# Patient Record
Sex: MALE | Race: WHITE | NOT HISPANIC OR LATINO | Employment: FULL TIME | URBAN - METROPOLITAN AREA
[De-identification: names, ages, dates, MRNs, and addresses within clinical notes are randomized per-mention and may not be internally consistent; named-entity substitution may affect disease eponyms.]

---

## 2021-03-05 ENCOUNTER — APPOINTMENT (OUTPATIENT)
Dept: RADIOLOGY | Facility: MEDICAL CENTER | Age: 31
End: 2021-03-05
Attending: EMERGENCY MEDICINE
Payer: MEDICAID

## 2021-03-05 ENCOUNTER — OFFICE VISIT (OUTPATIENT)
Dept: URGENT CARE | Facility: CLINIC | Age: 31
End: 2021-03-05
Payer: MEDICAID

## 2021-03-05 ENCOUNTER — HOSPITAL ENCOUNTER (EMERGENCY)
Facility: MEDICAL CENTER | Age: 31
End: 2021-03-05
Attending: EMERGENCY MEDICINE
Payer: MEDICAID

## 2021-03-05 VITALS
RESPIRATION RATE: 14 BRPM | SYSTOLIC BLOOD PRESSURE: 127 MMHG | HEIGHT: 75 IN | TEMPERATURE: 96.9 F | DIASTOLIC BLOOD PRESSURE: 57 MMHG | BODY MASS INDEX: 32.21 KG/M2 | WEIGHT: 259.04 LBS | OXYGEN SATURATION: 94 % | HEART RATE: 93 BPM

## 2021-03-05 VITALS
RESPIRATION RATE: 18 BRPM | OXYGEN SATURATION: 99 % | HEART RATE: 109 BPM | WEIGHT: 259.4 LBS | TEMPERATURE: 98.3 F | HEIGHT: 75 IN | SYSTOLIC BLOOD PRESSURE: 130 MMHG | BODY MASS INDEX: 32.25 KG/M2 | DIASTOLIC BLOOD PRESSURE: 70 MMHG

## 2021-03-05 DIAGNOSIS — L03.116 CELLULITIS OF LEFT LOWER EXTREMITY: ICD-10-CM

## 2021-03-05 DIAGNOSIS — L03.116 LEFT LEG CELLULITIS: ICD-10-CM

## 2021-03-05 DIAGNOSIS — M79.605 PAIN AND SWELLING OF LEFT LOWER EXTREMITY: ICD-10-CM

## 2021-03-05 DIAGNOSIS — M79.89 PAIN AND SWELLING OF LEFT LOWER EXTREMITY: ICD-10-CM

## 2021-03-05 LAB
ALBUMIN SERPL BCP-MCNC: 3.6 G/DL (ref 3.2–4.9)
ALBUMIN/GLOB SERPL: 1.1 G/DL
ALP SERPL-CCNC: 80 U/L (ref 30–99)
ALT SERPL-CCNC: 10 U/L (ref 2–50)
ANION GAP SERPL CALC-SCNC: 12 MMOL/L (ref 7–16)
AST SERPL-CCNC: 13 U/L (ref 12–45)
BASOPHILS # BLD AUTO: 0.4 % (ref 0–1.8)
BASOPHILS # BLD: 0.04 K/UL (ref 0–0.12)
BILIRUB SERPL-MCNC: 0.5 MG/DL (ref 0.1–1.5)
BUN SERPL-MCNC: 13 MG/DL (ref 8–22)
CALCIUM SERPL-MCNC: 9.2 MG/DL (ref 8.5–10.5)
CHLORIDE SERPL-SCNC: 97 MMOL/L (ref 96–112)
CO2 SERPL-SCNC: 22 MMOL/L (ref 20–33)
CREAT SERPL-MCNC: 0.58 MG/DL (ref 0.5–1.4)
EOSINOPHIL # BLD AUTO: 0.04 K/UL (ref 0–0.51)
EOSINOPHIL NFR BLD: 0.4 % (ref 0–6.9)
ERYTHROCYTE [DISTWIDTH] IN BLOOD BY AUTOMATED COUNT: 46.5 FL (ref 35.9–50)
GLOBULIN SER CALC-MCNC: 3.4 G/DL (ref 1.9–3.5)
GLUCOSE SERPL-MCNC: 109 MG/DL (ref 65–99)
HCT VFR BLD AUTO: 46.1 % (ref 42–52)
HGB BLD-MCNC: 14.9 G/DL (ref 14–18)
IMM GRANULOCYTES # BLD AUTO: 0.03 K/UL (ref 0–0.11)
IMM GRANULOCYTES NFR BLD AUTO: 0.3 % (ref 0–0.9)
LACTATE BLD-SCNC: 1 MMOL/L (ref 0.5–2)
LYMPHOCYTES # BLD AUTO: 1.5 K/UL (ref 1–4.8)
LYMPHOCYTES NFR BLD: 15.9 % (ref 22–41)
MCH RBC QN AUTO: 29.3 PG (ref 27–33)
MCHC RBC AUTO-ENTMCNC: 32.3 G/DL (ref 33.7–35.3)
MCV RBC AUTO: 90.6 FL (ref 81.4–97.8)
MONOCYTES # BLD AUTO: 0.76 K/UL (ref 0–0.85)
MONOCYTES NFR BLD AUTO: 8.1 % (ref 0–13.4)
NEUTROPHILS # BLD AUTO: 7.07 K/UL (ref 1.82–7.42)
NEUTROPHILS NFR BLD: 74.9 % (ref 44–72)
NRBC # BLD AUTO: 0 K/UL
NRBC BLD-RTO: 0 /100 WBC
PLATELET # BLD AUTO: 321 K/UL (ref 164–446)
PMV BLD AUTO: 9.7 FL (ref 9–12.9)
POTASSIUM SERPL-SCNC: 4.4 MMOL/L (ref 3.6–5.5)
PROT SERPL-MCNC: 7 G/DL (ref 6–8.2)
RBC # BLD AUTO: 5.09 M/UL (ref 4.7–6.1)
SODIUM SERPL-SCNC: 131 MMOL/L (ref 135–145)
WBC # BLD AUTO: 9.4 K/UL (ref 4.8–10.8)

## 2021-03-05 PROCEDURE — 83605 ASSAY OF LACTIC ACID: CPT

## 2021-03-05 PROCEDURE — 700102 HCHG RX REV CODE 250 W/ 637 OVERRIDE(OP): Performed by: EMERGENCY MEDICINE

## 2021-03-05 PROCEDURE — 87040 BLOOD CULTURE FOR BACTERIA: CPT

## 2021-03-05 PROCEDURE — A9270 NON-COVERED ITEM OR SERVICE: HCPCS | Performed by: EMERGENCY MEDICINE

## 2021-03-05 PROCEDURE — 80053 COMPREHEN METABOLIC PANEL: CPT

## 2021-03-05 PROCEDURE — 93971 EXTREMITY STUDY: CPT | Mod: LT

## 2021-03-05 PROCEDURE — 85025 COMPLETE CBC W/AUTO DIFF WBC: CPT

## 2021-03-05 PROCEDURE — 99203 OFFICE O/P NEW LOW 30 MIN: CPT | Performed by: PHYSICIAN ASSISTANT

## 2021-03-05 PROCEDURE — 99283 EMERGENCY DEPT VISIT LOW MDM: CPT

## 2021-03-05 RX ORDER — DOXYCYCLINE 100 MG/1
100 CAPSULE ORAL 2 TIMES DAILY
Qty: 14 CAPSULE | Refills: 0 | Status: SHIPPED | OUTPATIENT
Start: 2021-03-05 | End: 2021-03-12

## 2021-03-05 RX ORDER — DOXYCYCLINE 100 MG/1
100 TABLET ORAL ONCE
Status: COMPLETED | OUTPATIENT
Start: 2021-03-05 | End: 2021-03-05

## 2021-03-05 RX ADMIN — DOXYCYCLINE 100 MG: 100 TABLET, FILM COATED ORAL at 23:36

## 2021-03-05 ASSESSMENT — ENCOUNTER SYMPTOMS
EYE REDNESS: 0
EYE DISCHARGE: 0
FEVER: 0
SORE THROAT: 0
LEG PAIN: 1
VOMITING: 0
COUGH: 0
NAUSEA: 0
SHORTNESS OF BREATH: 0
HEADACHES: 0

## 2021-03-06 PROCEDURE — 93971 EXTREMITY STUDY: CPT | Mod: 26,LT | Performed by: INTERNAL MEDICINE

## 2021-03-06 NOTE — ED PROVIDER NOTES
"ED Provider Note    CHIEF COMPLAINT  Chief Complaint   Patient presents with   • Leg Swelling     pt sent from  for potential DVT workup. pt presents with LLE swelling and redness. pt states this started two days ago   • Deep Vein Thrombosis       HPI  Bar Dave is a 30 y.o. male who presents to the emergency room of her left leg pain and swelling. Past medical history is benign. No significant family history. Half pack per day smoker. Recently unemployed although about to start a new job at the employment facility. Yesterday he noticed some discomfort to his leg and he pulled up his pants noticed some swelling to his left calf and foot additionally with some redness and discomfort with palpation. Today became significantly worse and went to urgent care which was ultimately able to see him this evening and upon their physical exam the emergently tender to the ER for evaluation possible cellulitis versus DVT workup. No recent trauma. No significant physical exertion. No known trauma. No fever chills. No other complaints.    REVIEW OF SYSTEMS  See HPI for further details. All other systems are negative.     PAST MEDICAL HISTORY       SOCIAL HISTORY  Social History     Tobacco Use   • Smoking status: Current Every Day Smoker     Packs/day: 0.50   • Smokeless tobacco: Never Used   Substance and Sexual Activity   • Alcohol use: Not on file   • Drug use: Not on file   • Sexual activity: Not on file       SURGICAL HISTORY  patient denies any surgical history    CURRENT MEDICATIONS  Home Medications     Reviewed by Marianna Rios R.N. (Registered Nurse) on 03/05/21 at 1939  Med List Status: <None>   Medication Last Dose Status        Patient Feliciano Taking any Medications                       ALLERGIES  No Known Allergies    PHYSICAL EXAM  VITAL SIGNS: /57   Pulse 93   Temp 36.1 °C (96.9 °F) (Temporal)   Resp 14   Ht 1.905 m (6' 3\")   Wt 118 kg (259 lb 0.7 oz)   SpO2 94%   BMI 32.38 kg/m²  " @[facility][[id number]::@   Pulse ox interpretation: I interpret this pulse ox as normal.  Constitutional: Alert in no apparent distress.  HENT:  Bilateral external ears normal, Nose normal.   Eyes: Pupils are equal and reactive  Cardiovascular: Regular rate and rhythm, no murmurs.   Thorax & Lungs: Normal breath sounds, No respiratory distress  Abdomen: Bowel sounds normal, Soft, No tenderness  Skin: Warm, Dry, No erythema, No rash.    Extremities: Intact distal pulses. Left lower extremity: nontender hip, thigh and knee. Significant deep red erythema to the lateral distal leg which to a lighter shade of erythema is a circumferential and mildly tender to palpation with increased warmth. Additional brawny edema to foot with slight discoloration there which looks more chronic. One plus DPP  Musculoskeletal: Good range of motion in all major joints.   Neurologic: Alert , Normal motor function, Normal sensory function, No focal deficits noted.   Psychiatric: Affect normal, Judgment normal, Mood normal.       DIAGNOSTIC STUDIES / PROCEDURES    LABS  Results for orders placed or performed during the hospital encounter of 03/05/21   CBC WITH DIFFERENTIAL   Result Value Ref Range    WBC 9.4 4.8 - 10.8 K/uL    RBC 5.09 4.70 - 6.10 M/uL    Hemoglobin 14.9 14.0 - 18.0 g/dL    Hematocrit 46.1 42.0 - 52.0 %    MCV 90.6 81.4 - 97.8 fL    MCH 29.3 27.0 - 33.0 pg    MCHC 32.3 (L) 33.7 - 35.3 g/dL    RDW 46.5 35.9 - 50.0 fL    Platelet Count 321 164 - 446 K/uL    MPV 9.7 9.0 - 12.9 fL    Neutrophils-Polys 74.90 (H) 44.00 - 72.00 %    Lymphocytes 15.90 (L) 22.00 - 41.00 %    Monocytes 8.10 0.00 - 13.40 %    Eosinophils 0.40 0.00 - 6.90 %    Basophils 0.40 0.00 - 1.80 %    Immature Granulocytes 0.30 0.00 - 0.90 %    Nucleated RBC 0.00 /100 WBC    Neutrophils (Absolute) 7.07 1.82 - 7.42 K/uL    Lymphs (Absolute) 1.50 1.00 - 4.80 K/uL    Monos (Absolute) 0.76 0.00 - 0.85 K/uL    Eos (Absolute) 0.04 0.00 - 0.51 K/uL    Baso (Absolute) 0.04 0.00 -  0.12 K/uL    Immature Granulocytes (abs) 0.03 0.00 - 0.11 K/uL    NRBC (Absolute) 0.00 K/uL   COMP METABOLIC PANEL   Result Value Ref Range    Sodium 131 (L) 135 - 145 mmol/L    Potassium 4.4 3.6 - 5.5 mmol/L    Chloride 97 96 - 112 mmol/L    Co2 22 20 - 33 mmol/L    Anion Gap 12.0 7.0 - 16.0    Glucose 109 (H) 65 - 99 mg/dL    Bun 13 8 - 22 mg/dL    Creatinine 0.58 0.50 - 1.40 mg/dL    Calcium 9.2 8.5 - 10.5 mg/dL    AST(SGOT) 13 12 - 45 U/L    ALT(SGPT) 10 2 - 50 U/L    Alkaline Phosphatase 80 30 - 99 U/L    Total Bilirubin 0.5 0.1 - 1.5 mg/dL    Albumin 3.6 3.2 - 4.9 g/dL    Total Protein 7.0 6.0 - 8.2 g/dL    Globulin 3.4 1.9 - 3.5 g/dL    A-G Ratio 1.1 g/dL   LACTIC ACID   Result Value Ref Range    Lactic Acid 1.0 0.5 - 2.0 mmol/L   ESTIMATED GFR   Result Value Ref Range    GFR If African American >60 >60 mL/min/1.73 m 2    GFR If Non African American >60 >60 mL/min/1.73 m 2         RADIOLOGY  US-EXTREMITY VENOUS LOWER UNILAT LEFT                 COURSE & MEDICAL DECISION MAKING  Pertinent Labs & Imaging studies reviewed. (See chart for details)  30-year-old male presented the emergency department with leg swelling and redness. History as above. DVT ultrasound is negative. Laboratory valuation also largely unremarkable. No elevation of lactic acid. No significant elevations WBCs although slight left shift. At this point the patient will be started on doxycycline for coverage for cellulitis. He will follow-up with his primary care physician next week for follow-up is understanding of strict return precautions any changes or worsening or no improvement.      The patient will return for worsening symptoms and is stable at the time of discharge. The patient verbalizes understanding and will comply.    FINAL IMPRESSION  1. Left leg cellulitis            Electronically signed by: Reynaldo Dudley M.D., 3/5/2021 10:18 PM

## 2021-03-06 NOTE — ED TRIAGE NOTES
"Chief Complaint   Patient presents with   • Leg Swelling     pt sent from  for potential DVT workup. pt presents with LLE swelling and redness. pt states this started two days ago   • Deep Vein Thrombosis         Pt walk in today for above complaint. Pt ambulatory into triage room. Pt states yesterday when he was walking around he was limping a little bit, pt denies any tenderness behind his knee. Pts LLE is warm, swelling extends to the foot. LLE is very red as well. Pt denies any SOB with ambulation. Educated pt on triage process and to notify if there is any change.          /97   Pulse (!) 114   Temp 36.1 °C (96.9 °F) (Temporal)   Resp 14   Ht 1.905 m (6' 3\")   Wt 118 kg (259 lb 0.7 oz)   SpO2 96%   BMI 32.38 kg/m²     "

## 2021-03-06 NOTE — ED NOTES
Pt given discharge instructions, prescription sent to pharm, first dose given here. PT ambulatory to [place].

## 2021-03-06 NOTE — PROGRESS NOTES
"Subjective:      Bar Dave is a 30 y.o. male who presents with Leg Pain (Swollen (L) leg, Very red, tender, x1 day )          This is a new problem.   The patient presents to clinic complaining of possible cellulitis to the left lower leg x1 day.  The patient states he noticed his left lower leg felt \"tight\".  The patient states he then noticed associated swelling and redness to the left lower leg.  The patient reports associated tenderness to the left lower leg.  The patient reports no recent injury or trauma to the left lower leg.  He also reports no associated fever.  The patient is taken OTC ibuprofen for his current symptoms.  The patient denies recent prolonged sitting/travel.  He does not use hormones.  The patient is a current smoker.  The patient reports no prior history of DVT/PE.    Leg Pain  This is a new problem. Episode onset: x 1 day ago. The problem occurs constantly. The problem has been gradually worsening. Pertinent negatives include no chest pain, congestion, coughing, fever, headaches, nausea, rash, sore throat or vomiting. He has tried NSAIDs for the symptoms.     PMH:  has no past medical history on file.  MEDS: No current outpatient medications on file.  ALLERGIES: No Known Allergies  SURGHX: No past surgical history on file.  SOCHX:  reports that he has been smoking. He has never used smokeless tobacco.  FH: Family history was reviewed, no pertinent findings to report    Review of Systems   Constitutional: Negative for fever.   HENT: Negative for congestion, ear pain and sore throat.    Eyes: Negative for discharge and redness.   Respiratory: Negative for cough and shortness of breath.    Cardiovascular: Positive for leg swelling. Negative for chest pain.   Gastrointestinal: Negative for nausea and vomiting.   Musculoskeletal: Negative for joint pain.   Skin: Negative for rash.   Neurological: Negative for headaches.          Objective:     /70 (BP Location: Left arm, Patient " "Position: Sitting, BP Cuff Size: Adult long)   Pulse (!) 109   Temp 36.8 °C (98.3 °F) (Temporal)   Resp 18   Ht 1.905 m (6' 3\")   Wt 118 kg (259 lb 6.4 oz)   SpO2 99%   BMI 32.42 kg/m²      Physical Exam  Constitutional:       General: He is not in acute distress.     Appearance: Normal appearance. He is well-developed. He is not ill-appearing.   HENT:      Head: Normocephalic and atraumatic.      Right Ear: External ear normal.      Left Ear: External ear normal.   Eyes:      Extraocular Movements: Extraocular movements intact.      Conjunctiva/sclera: Conjunctivae normal.   Cardiovascular:      Rate and Rhythm: Tachycardia present.   Pulmonary:      Effort: Pulmonary effort is normal.   Musculoskeletal:      Cervical back: Normal range of motion and neck supple.      Comments:   Left Lower Leg:  Diffuse swelling of the left lower leg involving the left ankle and foot with associated tenderness to palpation, erythema, and increased warmth.  No open wounds/lesions.  No discharge/weeping.   Skin:     General: Skin is warm and dry.   Neurological:      Mental Status: He is alert and oriented to person, place, and time.                 Assessment/Plan:       1. Pain and swelling of left lower extremity    2. Cellulitis of left lower extremity    The patient's presenting symptoms and physical exam findings are consistent with pain and swelling of the left lower extremity likely secondary to acute cellulitis.  On physical exam, the patient had diffuse swelling of the left lower leg involving the left ankle and foot with associated tenderness palpation, erythema, and increased warmth.  No open wounds/lesions were appreciated.  No discharge/weeping was noted.  Based on the patient's presenting symptoms and physical exam findings, I believe the patient would benefit from a higher level of care.  The patient symptoms are consistent with significant cellulitis of the left lower extremity.  However, an acute DVT cannot " be ruled out at this time.  Therefore, I recommend the patient transferred to the [facility] for further evaluation and management.  The patient agrees to this plan.  The patient stable for transfer via private vehicle at this time.  Advised the patient of the social risk of not seeking further care for his current symptoms, and he verbalized understanding.    Plan:  Transfer patient to [facility] for further evaluation and management.    I personally reviewed prior external notes and test results pertinent to today's visit.  I have independently reviewed and interpreted all diagnostics ordered during this urgent care visit.     Time spent evaluating this patient was at least 30 minutes and includes preparing for visit, obtaining history, exam and evaluation, ordering labs/tests/procedures/medications, independent interpretation, and counseling/education.    Please note that this dictation was created using voice recognition software. I have made every reasonable attempt to correct obvious errors, but I expect that there may be errors of grammar and possibly content that I did not discover before finalizing the note.     This note was electronically signed by Abbie Raphael PA-C

## 2021-03-11 LAB
BACTERIA BLD CULT: NORMAL
BACTERIA BLD CULT: NORMAL
SIGNIFICANT IND 70042: NORMAL
SIGNIFICANT IND 70042: NORMAL
SITE SITE: NORMAL
SITE SITE: NORMAL
SOURCE SOURCE: NORMAL
SOURCE SOURCE: NORMAL

## 2021-06-18 ENCOUNTER — TELEPHONE (OUTPATIENT)
Dept: SCHEDULING | Facility: IMAGING CENTER | Age: 31
End: 2021-06-18

## 2021-06-18 ENCOUNTER — OFFICE VISIT (OUTPATIENT)
Dept: MEDICAL GROUP | Facility: PHYSICIAN GROUP | Age: 31
End: 2021-06-18
Payer: COMMERCIAL

## 2021-06-18 VITALS
RESPIRATION RATE: 18 BRPM | BODY MASS INDEX: 31.16 KG/M2 | HEIGHT: 75 IN | SYSTOLIC BLOOD PRESSURE: 118 MMHG | WEIGHT: 250.6 LBS | TEMPERATURE: 98.8 F | OXYGEN SATURATION: 95 % | HEART RATE: 110 BPM | DIASTOLIC BLOOD PRESSURE: 80 MMHG

## 2021-06-18 DIAGNOSIS — F17.219 CIGARETTE NICOTINE DEPENDENCE WITH NICOTINE-INDUCED DISORDER: ICD-10-CM

## 2021-06-18 DIAGNOSIS — E66.3 OVERWEIGHT: ICD-10-CM

## 2021-06-18 DIAGNOSIS — M25.511 PAIN IN JOINT OF RIGHT SHOULDER: ICD-10-CM

## 2021-06-18 DIAGNOSIS — Z00.00 WELLNESS EXAMINATION: ICD-10-CM

## 2021-06-18 DIAGNOSIS — R22.42 LOCALIZED SWELLING OF LEFT LOWER LEG: ICD-10-CM

## 2021-06-18 PROCEDURE — 99203 OFFICE O/P NEW LOW 30 MIN: CPT | Mod: 25 | Performed by: FAMILY MEDICINE

## 2021-06-18 PROCEDURE — 90471 IMMUNIZATION ADMIN: CPT | Performed by: FAMILY MEDICINE

## 2021-06-18 PROCEDURE — 90715 TDAP VACCINE 7 YRS/> IM: CPT | Performed by: FAMILY MEDICINE

## 2021-06-18 ASSESSMENT — FIBROSIS 4 INDEX: FIB4 SCORE: 0.41

## 2021-06-18 ASSESSMENT — PATIENT HEALTH QUESTIONNAIRE - PHQ9: CLINICAL INTERPRETATION OF PHQ2 SCORE: 0

## 2021-06-18 NOTE — PROGRESS NOTES
Subjective:     CC:   Chief Complaint   Patient presents with   • Establish Care       HPI:   Bar presents today to establish care and also needs a work physical form filled out.  Patient has not had a lipid panel done we will need to get that done.  Patient was seen a couple months ago for swelling to his left lower leg at that time she had a ultrasound done which was negative for blood clot.  And he was treated for cellulitis.  Patient expressed concern that this is happened before patient is not using compression stockings sounds like he may have gotten medical grade compression stockings that are hard to put on.  Patient also has had a procedure done to his right shoulder that states that it occasionally still bothers him.  Patient admits that he has gained weight recently due to Covid.  Patient also has continued to smoke but states that his work has a smoking cessation program that he is interested in participating in    History reviewed. No pertinent past medical history.    Social History     Tobacco Use   • Smoking status: Current Every Day Smoker     Packs/day: 0.50   • Smokeless tobacco: Never Used   Vaping Use   • Vaping Use: Some days   • Substances: Nicotine   • Devices: [employer] tank   Substance Use Topics   • Alcohol use: Yes     Comment: Rare   • Drug use: Never       No current [facility]-ordered outpatient medications on file.     No current [facility]-ordered facility-administered medications on file.       Allergies:  Patient has no known allergies.    Health Maintenance: Completed    ROS:  Gen: no fevers/chills  Eyes: no changes in vision  ENT: no sore throat, no hearing loss, no bloody nose  Pulm: no sob, no cough  CV: no chest pain, no palpitations  GI: no nausea/vomiting, no diarrhea  : no dysuria  MSk: no myalgias  Skin: no rash  Neuro: no headaches, no numbness/tingling  Heme/Lymph: no easy bruising    Objective:     Exam:  /80   Pulse (!) 110   Temp 37.1 °C (98.8 °F)   Resp 18   Ht  "1.905 m (6' 3\")   Wt 114 kg (250 lb 9.6 oz)   SpO2 95%   BMI 31.32 kg/m²  Body mass index is 31.32 kg/m².    Gen: Alert and oriented, No apparent distress.  Skin: Warm and dry.  No obvious lesions.  Eyes: Sclera wnl Pupils normal in size  ENT: Canals wnl and TM are not red, on oral exam no lesions noted   Lungs: Normal effort, CTA bilaterally, no wheezes, rhonchi, or rales  CV: Regular rate and rhythm. No murmurs, rubs, or gallops.  ABD: Soft non-tender no organomegaly  Musculoskeletal: Normal gait. No extremity cyanosis, clubbing, or edema.  Neuro: Oriented to person, place and time  Psych: Mood is wnl       Labs: Fasting labs were ordered I gave him a listing of all the [facility] labs he can go to    Assessment & Plan:     30 y.o. male with the following -     1. Wellness examination  Patient is agreeable to get his tetanus shot done he would like to wait on his pneumonia shot  - Tdap =>6yo IM  - CBC WITH DIFFERENTIAL; Future  - Lipid Profile; Future    2. Pain in joint of right shoulder  We will see patient back for follow-up for further discussion on whether he wants a referral to Ortho this is a chronic problem    3. Cigarette nicotine dependence with nicotine-induced disorder  .  Patient is interested in stop smoking he will further pursue this with his work this is a chronic problem    4. Overweight  Get his thyroid test done this is a chronic problem  - FREE THYROXINE; Future  - TSH; Future    5. Localized swelling of left lower leg  I would recommend patient start wearing them at night some mild compression stockings he can buy at the running store to see if this seems to help if this continues may need to refer him to either vascular or dermatology this is a chronic problem       Return in about 4 weeks (around 7/16/2021).    Please note that this dictation was created using voice recognition software. I have made every reasonable attempt to correct obvious errors, but I expect that there are errors of " grammar and possibly content that I did not discover before finalizing the note.

## 2021-06-25 ENCOUNTER — APPOINTMENT (OUTPATIENT)
Dept: MEDICAL GROUP | Facility: PHYSICIAN GROUP | Age: 31
End: 2021-06-25
Payer: COMMERCIAL

## 2021-07-13 ENCOUNTER — HOSPITAL ENCOUNTER (OUTPATIENT)
Dept: LAB | Facility: MEDICAL CENTER | Age: 31
End: 2021-07-13
Attending: FAMILY MEDICINE
Payer: COMMERCIAL

## 2021-07-13 DIAGNOSIS — E66.3 OVERWEIGHT: ICD-10-CM

## 2021-07-13 DIAGNOSIS — Z00.00 WELLNESS EXAMINATION: ICD-10-CM

## 2021-07-13 LAB
BASOPHILS # BLD AUTO: 0.7 % (ref 0–1.8)
BASOPHILS # BLD: 0.04 K/UL (ref 0–0.12)
CHOLEST SERPL-MCNC: 170 MG/DL (ref 100–199)
EOSINOPHIL # BLD AUTO: 0.06 K/UL (ref 0–0.51)
EOSINOPHIL NFR BLD: 1 % (ref 0–6.9)
ERYTHROCYTE [DISTWIDTH] IN BLOOD BY AUTOMATED COUNT: 48.7 FL (ref 35.9–50)
FASTING STATUS PATIENT QL REPORTED: NORMAL
HCT VFR BLD AUTO: 48.5 % (ref 42–52)
HDLC SERPL-MCNC: 39 MG/DL
HGB BLD-MCNC: 15.6 G/DL (ref 14–18)
IMM GRANULOCYTES # BLD AUTO: 0.01 K/UL (ref 0–0.11)
IMM GRANULOCYTES NFR BLD AUTO: 0.2 % (ref 0–0.9)
LDLC SERPL CALC-MCNC: 122 MG/DL
LYMPHOCYTES # BLD AUTO: 1.53 K/UL (ref 1–4.8)
LYMPHOCYTES NFR BLD: 26 % (ref 22–41)
MCH RBC QN AUTO: 29.8 PG (ref 27–33)
MCHC RBC AUTO-ENTMCNC: 32.2 G/DL (ref 33.7–35.3)
MCV RBC AUTO: 92.7 FL (ref 81.4–97.8)
MONOCYTES # BLD AUTO: 0.37 K/UL (ref 0–0.85)
MONOCYTES NFR BLD AUTO: 6.3 % (ref 0–13.4)
NEUTROPHILS # BLD AUTO: 3.88 K/UL (ref 1.82–7.42)
NEUTROPHILS NFR BLD: 65.8 % (ref 44–72)
NRBC # BLD AUTO: 0 K/UL
NRBC BLD-RTO: 0 /100 WBC
PLATELET # BLD AUTO: 311 K/UL (ref 164–446)
PMV BLD AUTO: 10.6 FL (ref 9–12.9)
RBC # BLD AUTO: 5.23 M/UL (ref 4.7–6.1)
T4 FREE SERPL-MCNC: 1.07 NG/DL (ref 0.93–1.7)
TRIGL SERPL-MCNC: 46 MG/DL (ref 0–149)
TSH SERPL DL<=0.005 MIU/L-ACNC: 0.19 UIU/ML (ref 0.38–5.33)
WBC # BLD AUTO: 5.9 K/UL (ref 4.8–10.8)

## 2021-07-13 PROCEDURE — 85025 COMPLETE CBC W/AUTO DIFF WBC: CPT

## 2021-07-13 PROCEDURE — 36415 COLL VENOUS BLD VENIPUNCTURE: CPT

## 2021-07-13 PROCEDURE — 84439 ASSAY OF FREE THYROXINE: CPT

## 2021-07-13 PROCEDURE — 84443 ASSAY THYROID STIM HORMONE: CPT

## 2021-07-13 PROCEDURE — 80061 LIPID PANEL: CPT

## 2021-07-22 ENCOUNTER — OFFICE VISIT (OUTPATIENT)
Dept: MEDICAL GROUP | Facility: PHYSICIAN GROUP | Age: 31
End: 2021-07-22
Payer: COMMERCIAL

## 2021-07-22 VITALS
BODY MASS INDEX: 31.13 KG/M2 | HEIGHT: 75 IN | TEMPERATURE: 98.5 F | OXYGEN SATURATION: 95 % | WEIGHT: 250.4 LBS | HEART RATE: 98 BPM | DIASTOLIC BLOOD PRESSURE: 82 MMHG | RESPIRATION RATE: 16 BRPM | SYSTOLIC BLOOD PRESSURE: 122 MMHG

## 2021-07-22 DIAGNOSIS — R79.89 DECREASED THYROID STIMULATING HORMONE (TSH) LEVEL: ICD-10-CM

## 2021-07-22 DIAGNOSIS — R73.9 HYPERGLYCEMIA: ICD-10-CM

## 2021-07-22 DIAGNOSIS — E78.6 LOW HDL (UNDER 40): ICD-10-CM

## 2021-07-22 PROCEDURE — 99214 OFFICE O/P EST MOD 30 MIN: CPT | Performed by: FAMILY MEDICINE

## 2021-07-22 ASSESSMENT — FIBROSIS 4 INDEX: FIB4 SCORE: 0.42

## 2021-07-22 NOTE — PROGRESS NOTES
"Subjective:     CC:   Chief Complaint   Patient presents with   • Follow-Up       HPI:   Bar presents today for follow-up of his labs.  Patient states he is doing well and trying to exercise and lose weight.    History reviewed. No pertinent past medical history.    Social History     Tobacco Use   • Smoking status: Current Every Day Smoker     Packs/day: 0.50   • Smokeless tobacco: Never Used   Vaping Use   • Vaping Use: Some days   • Substances: Nicotine   • Devices: [employer] tank   Substance Use Topics   • Alcohol use: Yes     Comment: Rare   • Drug use: Never       No current [facility]-ordered outpatient medications on file.     No current [facility]-ordered facility-administered medications on file.       Allergies:  Patient has no known allergies.    Health Maintenance: Completed    ROS:  Gen: no fevers/chills  Eyes: no changes in vision  ENT: no sore throat, no hearing loss, no bloody nose  Pulm: no sob, no cough  CV: no chest pain, no palpitations  GI: no nausea/vomiting, no diarrhea  : no dysuria  Neuro: no headaches, no numbness/tingling  Heme/Lymph: no easy bruising    Objective:     Exam:  /82   Pulse 98   Temp 36.9 °C (98.5 °F)   Resp 16   Ht 1.905 m (6' 3\")   Wt 114 kg (250 lb 6.4 oz)   SpO2 95%   BMI 31.30 kg/m²  Body mass index is 31.3 kg/m².    Gen: Alert and oriented, No apparent distress.  Skin: Warm and dry.  No obvious lesions.  Eyes: Sclera wnl Pupils normal in size  Neck: Patient has no thyroid enlargement or tenderness on palpation  Lungs: Normal effort, CTA bilaterally, no wheezes, rhonchi, or rales  CV: Regular rate and rhythm. No murmurs, rubs, or gallops.  Musculoskeletal: Normal gait. No extremity cyanosis, clubbing, or edema.  Neuro: Oriented to person, place and time  Psych: Mood is wnl       Labs: Recommend get nonfasting blood sugar this was ordered    Assessment & Plan:     30 y.o. male with the following -     1. Decreased thyroid stimulating hormone (TSH) " level  Recommend doing a T3 and also thyroid antibody test.  This is a acute problem  - Comp Metabolic Panel; Future  - TRIIDOTHYRONINE; Future  - THYROID ANTIBODIES    2. Low HDL (under 40)  I went over his lipid panel his HDL is below 41 way to increase this is regular exercise we will repeat this again in 3 months this is a chronic problem    3. Hyperglycemia  We will go ahead and recheck this since the last glucose was nonfasting       Return in about 1 week (around 7/29/2021).    Please note that this dictation was created using voice recognition software. I have made every reasonable attempt to correct obvious errors, but I expect that there are errors of grammar and possibly content that I did not discover before finalizing the note.

## 2021-07-31 ENCOUNTER — HOSPITAL ENCOUNTER (OUTPATIENT)
Dept: LAB | Facility: MEDICAL CENTER | Age: 31
End: 2021-07-31
Attending: FAMILY MEDICINE
Payer: COMMERCIAL

## 2021-07-31 DIAGNOSIS — R79.89 DECREASED THYROID STIMULATING HORMONE (TSH) LEVEL: ICD-10-CM

## 2021-07-31 LAB
ALBUMIN SERPL BCP-MCNC: 4.4 G/DL (ref 3.2–4.9)
ALBUMIN/GLOB SERPL: 1.5 G/DL
ALP SERPL-CCNC: 77 U/L (ref 30–99)
ALT SERPL-CCNC: 25 U/L (ref 2–50)
ANION GAP SERPL CALC-SCNC: 10 MMOL/L (ref 7–16)
AST SERPL-CCNC: 17 U/L (ref 12–45)
BILIRUB SERPL-MCNC: 0.5 MG/DL (ref 0.1–1.5)
BUN SERPL-MCNC: 16 MG/DL (ref 8–22)
CALCIUM SERPL-MCNC: 9.1 MG/DL (ref 8.5–10.5)
CHLORIDE SERPL-SCNC: 102 MMOL/L (ref 96–112)
CO2 SERPL-SCNC: 26 MMOL/L (ref 20–33)
CREAT SERPL-MCNC: 0.83 MG/DL (ref 0.5–1.4)
GLOBULIN SER CALC-MCNC: 3 G/DL (ref 1.9–3.5)
GLUCOSE SERPL-MCNC: 107 MG/DL (ref 65–99)
POTASSIUM SERPL-SCNC: 4 MMOL/L (ref 3.6–5.5)
PROT SERPL-MCNC: 7.4 G/DL (ref 6–8.2)
SODIUM SERPL-SCNC: 138 MMOL/L (ref 135–145)
T3 SERPL-MCNC: 119 NG/DL (ref 60–181)

## 2021-07-31 PROCEDURE — 86376 MICROSOMAL ANTIBODY EACH: CPT

## 2021-07-31 PROCEDURE — 84480 ASSAY TRIIODOTHYRONINE (T3): CPT

## 2021-07-31 PROCEDURE — 36415 COLL VENOUS BLD VENIPUNCTURE: CPT

## 2021-07-31 PROCEDURE — 80053 COMPREHEN METABOLIC PANEL: CPT

## 2021-08-02 LAB — THYROPEROXIDASE AB SERPL-ACNC: 2.6 IU/ML (ref 0–9)

## 2021-08-04 ENCOUNTER — OFFICE VISIT (OUTPATIENT)
Dept: MEDICAL GROUP | Facility: PHYSICIAN GROUP | Age: 31
End: 2021-08-04
Payer: COMMERCIAL

## 2021-08-04 VITALS
HEIGHT: 75 IN | OXYGEN SATURATION: 94 % | SYSTOLIC BLOOD PRESSURE: 122 MMHG | WEIGHT: 253.38 LBS | DIASTOLIC BLOOD PRESSURE: 78 MMHG | BODY MASS INDEX: 31.5 KG/M2 | RESPIRATION RATE: 20 BRPM | TEMPERATURE: 98.1 F | HEART RATE: 100 BPM

## 2021-08-04 DIAGNOSIS — E66.3 OVERWEIGHT: ICD-10-CM

## 2021-08-04 DIAGNOSIS — R73.9 HYPERGLYCEMIA: ICD-10-CM

## 2021-08-04 DIAGNOSIS — R79.89 LOW TSH LEVEL: ICD-10-CM

## 2021-08-04 DIAGNOSIS — E78.6 LOW HDL (UNDER 40): ICD-10-CM

## 2021-08-04 PROCEDURE — 99213 OFFICE O/P EST LOW 20 MIN: CPT | Performed by: FAMILY MEDICINE

## 2021-08-04 ASSESSMENT — FIBROSIS 4 INDEX: FIB4 SCORE: 0.35

## 2021-08-04 NOTE — PROGRESS NOTES
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  Patient is working on increasing his exercise and also avoiding breads and pasta.  Patient has no complaints today    History reviewed. No pertinent past medical history.    Social History     Tobacco Use   • Smoking status: Current Every Day Smoker     Packs/day: 0.50   • Smokeless tobacco: Never Used   Vaping Use   • Vaping Use: Some days   • Substances: Nicotine   • Devices: [employer] tank   Substance Use Topics   • Alcohol use: Yes     Comment: Rare   • Drug use: Never       No current [facility]-ordered outpatient medications on file.     No current [facility]-ordered facility-administered medications on file.       Allergies:  Patient has no known allergies.    Health Maintenance: Completed    ROS:  Gen: no fevers/chills  Pulm: no sob, no cough  CV: no chest pain, no palpitations  Neuro: no headaches, no numbness/tingling  Heme/Lymph: no easy bruising    Objective:     Exam:  /78 (BP Location: Right arm, Patient Position: Sitting, BP Cuff Size: Large adult)   Pulse 100   Temp 36.7 °C (98.1 °F) (Temporal)   Resp 20   Ht 1.892 m (6' 2.5\")   Wt 115 kg (253 lb 6 oz)   SpO2 94%   BMI 32.10 kg/m²  Body mass index is 32.1 kg/m².    Gen: Alert and oriented, No apparent distress.  Skin: Warm and dry.  No obvious lesions.  Eyes: Sclera wnl Pupils normal in size  Musculoskeletal: Normal gait. No extremity cyanosis, clubbing, or edema.  Neuro: Oriented to person, place and time  Psych: Mood is wnl       Assessment & Plan:     30 y.o. male with the following -     1. Overweight  Patient will increase his activity he is also avoiding breads and pasta I recommend we repeat his lab work in 3 months patient was agreeable with this plan this is a chronic problem  2. Low HDL (under 40)  Patient will increase his activity " level and hopefully this will increase his HDL we will recheck this again in 3 months this is a chronic problem.  His paperwork for his work was filled out and given to patient  3. Hyperglycemia  We will repeat the fasting blood sugar along with hemoglobin A1c in 3 months this is a acute problem  4. Low TSH level  Patient's T3 is within normal limits question the TSH will need to repeat this we will go ahead and repeat this in 3 months patient to follow-up sooner if there is any concerns or problems.  1 thing to look out for is sudden weight loss or diarrhea.    Return in about 3 months (around 11/4/2021).    Please note that this dictation was created using voice recognition software. I have made every reasonable attempt to correct obvious errors, but I expect that there are errors of grammar and possibly content that I did not discover before finalizing the note.

## 2021-10-20 ENCOUNTER — APPOINTMENT (OUTPATIENT)
Dept: MEDICAL GROUP | Facility: PHYSICIAN GROUP | Age: 31
End: 2021-10-20
Payer: COMMERCIAL

## 2021-11-01 DIAGNOSIS — E78.6 LOW HDL (UNDER 40): ICD-10-CM

## 2021-11-01 DIAGNOSIS — R79.89 LOW TSH LEVEL: ICD-10-CM

## 2021-11-01 DIAGNOSIS — R73.9 HYPERGLYCEMIA: ICD-10-CM

## 2022-10-07 ENCOUNTER — OFFICE VISIT (OUTPATIENT)
Dept: URGENT CARE | Facility: CLINIC | Age: 32
End: 2022-10-07
Payer: COMMERCIAL

## 2022-10-07 VITALS
OXYGEN SATURATION: 95 % | BODY MASS INDEX: 33.32 KG/M2 | SYSTOLIC BLOOD PRESSURE: 134 MMHG | WEIGHT: 268 LBS | HEART RATE: 101 BPM | DIASTOLIC BLOOD PRESSURE: 84 MMHG | TEMPERATURE: 97.8 F | HEIGHT: 75 IN | RESPIRATION RATE: 17 BRPM

## 2022-10-07 DIAGNOSIS — J02.9 SORE THROAT: ICD-10-CM

## 2022-10-07 DIAGNOSIS — J35.1 ENLARGED TONSILS: ICD-10-CM

## 2022-10-07 DIAGNOSIS — J02.0 STREP PHARYNGITIS: ICD-10-CM

## 2022-10-07 DIAGNOSIS — H10.9 BACTERIAL CONJUNCTIVITIS OF BOTH EYES: ICD-10-CM

## 2022-10-07 DIAGNOSIS — B96.89 BACTERIAL CONJUNCTIVITIS OF BOTH EYES: ICD-10-CM

## 2022-10-07 DIAGNOSIS — H57.89 EYE REDNESS: ICD-10-CM

## 2022-10-07 LAB
INT CON NEG: NORMAL
INT CON POS: NORMAL
S PYO AG THROAT QL: POSITIVE

## 2022-10-07 PROCEDURE — 99214 OFFICE O/P EST MOD 30 MIN: CPT | Performed by: NURSE PRACTITIONER

## 2022-10-07 PROCEDURE — 87880 STREP A ASSAY W/OPTIC: CPT | Performed by: NURSE PRACTITIONER

## 2022-10-07 RX ORDER — POLYMYXIN B SULFATE AND TRIMETHOPRIM 1; 10000 MG/ML; [USP'U]/ML
1 SOLUTION OPHTHALMIC EVERY 4 HOURS
Qty: 4 ML | Refills: 0 | Status: SHIPPED | OUTPATIENT
Start: 2022-10-07 | End: 2022-10-17

## 2022-10-07 RX ORDER — AMOXICILLIN 875 MG/1
875 TABLET, COATED ORAL 2 TIMES DAILY
Qty: 14 TABLET | Refills: 0 | Status: SHIPPED | OUTPATIENT
Start: 2022-10-07 | End: 2022-10-14

## 2022-10-07 RX ORDER — DEXAMETHASONE SODIUM PHOSPHATE 4 MG/ML
8 INJECTION, SOLUTION INTRA-ARTICULAR; INTRALESIONAL; INTRAMUSCULAR; INTRAVENOUS; SOFT TISSUE ONCE
Status: COMPLETED | OUTPATIENT
Start: 2022-10-07 | End: 2022-10-07

## 2022-10-07 RX ADMIN — DEXAMETHASONE SODIUM PHOSPHATE 8 MG: 4 INJECTION, SOLUTION INTRA-ARTICULAR; INTRALESIONAL; INTRAMUSCULAR; INTRAVENOUS; SOFT TISSUE at 12:18

## 2022-10-07 ASSESSMENT — FIBROSIS 4 INDEX: FIB4 SCORE: 0.36

## 2022-10-07 NOTE — LETTER
October 7, 2022    To Whom It May Concern:         This is confirmation that Bar Dave attended his scheduled appointment with RIOS Duarte on 10/07/22. Please excuse him on 10/3 and 10/7 due to an acute illness.         If you have any questions please do not hesitate to call me at the phone number listed below.    Sincerely,          CRYSTAL Duarte.  [phone]

## 2022-10-07 NOTE — PROGRESS NOTES
"Subjective:   Bar Dave is a 31 y.o. male who presents for Eye Drainage (X 2 days, swelling, itchy, discharge, redness in both eyes)       HPI  Patient presents for evaluation of 5-day history of sore throat and pain with swallowing.  Patient has also had a 2-day history of unilateral eye redness, itching, burning, and eyes being sutured upon awakening.  Denies specific known ill contacts or exposures.  He has not tried anything yet for his symptoms.    ROS  All other systems are negative except as documented above within [facility].    MEDS: No current outpatient medications on file.  ALLERGIES: No Known Allergies    Patient's PMH, SocHx, SurgHx, FamHx, Drug allergies and medications were reviewed.     Objective:   /84 (BP Location: Left arm, Patient Position: Sitting, BP Cuff Size: Large adult)   Pulse (!) 101   Temp 36.6 °C (97.8 °F) (Temporal)   Resp 17   Ht 1.905 m (6' 3\")   Wt 122 kg (268 lb)   SpO2 95%   BMI 33.50 kg/m²     Physical Exam  Vitals and nursing note reviewed.   Constitutional:       General: He is awake.      Appearance: Normal appearance. He is well-developed and normal weight.   HENT:      Head: Normocephalic and atraumatic.      Right Ear: Tympanic membrane, ear canal and external ear normal.      Left Ear: Tympanic membrane, ear canal and external ear normal.      Nose: Nose normal.      Mouth/Throat:      Lips: Pink.      Mouth: Mucous membranes are moist.      Pharynx: Oropharynx is clear. Uvula midline.      Tonsils: Tonsillar exudate present. 3+ on the right. 3+ on the left.      Comments: [place]   Eyes:      Extraocular Movements: Extraocular movements intact.      Conjunctiva/sclera:      Right eye: Right conjunctiva is injected. Exudate present.      Left eye: Left conjunctiva is injected. Exudate present.      Pupils: Pupils are equal, round, and reactive to light.   Neck:      Thyroid: No thyromegaly.      Trachea: Trachea normal.   Cardiovascular:      Rate " and Rhythm: Normal rate and regular rhythm.      Pulses: Normal pulses.      Heart sounds: Normal heart sounds, S1 normal and S2 normal.   Pulmonary:      Effort: Pulmonary effort is normal. No respiratory distress.      Breath sounds: Normal breath sounds. No wheezing, rhonchi or rales.   Abdominal:      General: Bowel sounds are normal.      Palpations: Abdomen is soft.   Musculoskeletal:         General: Normal range of motion.      Cervical back: Full passive range of motion without pain, normal range of motion and neck supple.   Lymphadenopathy:      Cervical: No cervical adenopathy.   Skin:     General: Skin is warm and dry.      Capillary Refill: Capillary refill takes less than 2 seconds.   Neurological:      General: No focal deficit present.      Mental Status: He is alert and oriented to person, place, and time.      Gait: Gait is intact.   Psychiatric:         Attention and Perception: Attention and perception normal.         Mood and Affect: Mood normal.         Speech: Speech normal.         Behavior: Behavior normal. Behavior is cooperative.         Thought Content: Thought content normal.         Judgment: Judgment normal.       Assessment/Plan:   Assessment      1. Strep pharyngitis  - amoxicillin (AMOXIL) 875 MG tablet; Take 1 Tablet by mouth 2 times a day for 7 days.  Dispense: 14 Tablet; Refill: 0  - dexamethasone (DECADRON) injection 8 mg    2. Bacterial conjunctivitis of both eyes  - polymixin-trimethoprim (POLYTRIM) 94830-5.1 UNIT/ML-% Solution; Administer 1 Drop into both eyes every 4 hours for 10 days.  Dispense: 4 mL; Refill: 0    3. Enlarged tonsils  - dexamethasone (DECADRON) injection 8 mg  - POCT Rapid Strep A    4. Sore throat  - POCT Rapid Strep A    5. Eye redness      Vital signs stable at today's acute urgent care visit.  Review of any test results completed in clinic.  Begin medications as listed.    Advised the patient to follow-up with the primary care provider/urgent care if  symptoms persist.  Red flags discussed and indications to immediately call 911 or present to the ED. All questions were encouraged and answered to the patient's satisfaction and understanding, and they agree to the plan of care.     This is an acute problem with uncertain prognosis, medication management and instructions as well as management options were provided.  I personally reviewed prior external notes and test results pertinent to today and independently reviewed and interpreted all diagnostics. Time spent evaluating this patient includes preparing for visit, counseling/education, exam, evaluation, obtaining history, and ordering lab/test/procedures.      Please note that this dictation was created using voice recognition software. I have made a reasonable attempt to correct obvious errors, but I expect that there are errors of grammar and possibly content that I did not discover before finalizing the note.